# Patient Record
Sex: MALE
[De-identification: names, ages, dates, MRNs, and addresses within clinical notes are randomized per-mention and may not be internally consistent; named-entity substitution may affect disease eponyms.]

---

## 2023-06-05 PROBLEM — Z00.00 ENCOUNTER FOR PREVENTIVE HEALTH EXAMINATION: Status: ACTIVE | Noted: 2023-06-05

## 2023-06-07 ENCOUNTER — NON-APPOINTMENT (OUTPATIENT)
Age: 48
End: 2023-06-07

## 2023-06-14 ENCOUNTER — APPOINTMENT (OUTPATIENT)
Dept: FAMILY MEDICINE | Facility: CLINIC | Age: 48
End: 2023-06-14
Payer: COMMERCIAL

## 2023-06-14 ENCOUNTER — NON-APPOINTMENT (OUTPATIENT)
Age: 48
End: 2023-06-14

## 2023-06-14 VITALS
WEIGHT: 136 LBS | RESPIRATION RATE: 12 BRPM | HEART RATE: 65 BPM | DIASTOLIC BLOOD PRESSURE: 70 MMHG | SYSTOLIC BLOOD PRESSURE: 138 MMHG | HEIGHT: 67 IN | OXYGEN SATURATION: 99 % | BODY MASS INDEX: 21.35 KG/M2

## 2023-06-14 VITALS — SYSTOLIC BLOOD PRESSURE: 161 MMHG | DIASTOLIC BLOOD PRESSURE: 75 MMHG

## 2023-06-14 DIAGNOSIS — Z82.49 FAMILY HISTORY OF ISCHEMIC HEART DISEASE AND OTHER DISEASES OF THE CIRCULATORY SYSTEM: ICD-10-CM

## 2023-06-14 PROCEDURE — 99203 OFFICE O/P NEW LOW 30 MIN: CPT | Mod: 25

## 2023-06-14 PROCEDURE — 93000 ELECTROCARDIOGRAM COMPLETE: CPT

## 2023-06-14 NOTE — HISTORY OF PRESENT ILLNESS
[FreeTextEntry1] : pt presents to establish care as new patient \par pt presents for blood pressure check \par  [de-identified] : 47 yr old male new patient prior PCP years ago Dr. Alba. No prior medications and is former cigarette smoker who currently vapes.[facility]  had [facility] at [facility]. He was then later referred to general surgeon and is planned for surgery July 17th however he was noted to have  HIgh /99, 146/92. Today  he denies changes in vision, dizziness, chest pain, palpitations and lower extremity edema.\par \par \par

## 2023-06-14 NOTE — PHYSICAL EXAM
[No Acute Distress] : no acute distress [Normal Sclera/Conjunctiva] : normal sclera/conjunctiva [Normal Outer Ear/Nose] : the outer ears and nose were normal in appearance [No JVD] : no jugular venous distention [No Respiratory Distress] : no respiratory distress  [No Accessory Muscle Use] : no accessory muscle use [Normal Rate] : normal rate  [Regular Rhythm] : with a regular rhythm [Normal S1, S2] : normal S1 and S2 [No Murmur] : no murmur heard [No Carotid Bruits] : no carotid bruits [No Extremity Clubbing/Cyanosis] : no extremity clubbing/cyanosis [de-identified] : diminished breathe sounds

## 2023-06-14 NOTE — COUNSELING
[Yes] : Risk of tobacco use and health benefits of smoking cessation discussed: Yes [Cessation strategies including cessation program discussed] : Cessation strategies including cessation program discussed [Use of nicotine replacement therapies and other medications discussed] : Use of nicotine replacement therapies and other medications discussed [Encouraged to pick a quit date and identify support needed to quit] : Encouraged to pick a quit date and identify support needed to quit [Encouraged to increase physical activity] : Encouraged to increase physical activity

## 2023-06-14 NOTE — PLAN
[FreeTextEntry1] : 47 yr old male new patient \par \par elevated BP \par newly DX HTN: \par ECG NSR \par will refer to cardio for echo \par smoking cessation advised \par will start Norvasc 5 mg PO at bedtime \par will f/u in 2 weeks with BP \par monitor BP at home \par \par for PST due to RUQ \par will request ZP US \par will need labs at time \par \par RTO for follow up with above

## 2023-06-14 NOTE — HEALTH RISK ASSESSMENT
[Intercurrent Urgi Care visits] : went to urgent care [1 or 2 (0 pts)] : 1 or 2 (0 points) [Never (0 pts)] : Never (0 points) [No] : In the past 12 months have you used drugs other than those required for medical reasons? No [No falls in past year] : Patient reported no falls in the past year [Former] : Former [Employed] : employed [] :  [de-identified] : June 2nd  [de-identified] : gen surg  [Audit-CScore] : 0 [ColonoscopyComments] : EGD  [de-identified] : augusto

## 2023-06-21 ENCOUNTER — APPOINTMENT (OUTPATIENT)
Dept: FAMILY MEDICINE | Facility: CLINIC | Age: 48
End: 2023-06-21
Payer: COMMERCIAL

## 2023-06-21 VITALS — SYSTOLIC BLOOD PRESSURE: 136 MMHG | DIASTOLIC BLOOD PRESSURE: 72 MMHG

## 2023-06-21 VITALS
HEIGHT: 67 IN | SYSTOLIC BLOOD PRESSURE: 142 MMHG | TEMPERATURE: 97.6 F | DIASTOLIC BLOOD PRESSURE: 94 MMHG | OXYGEN SATURATION: 97 % | WEIGHT: 136 LBS | HEART RATE: 77 BPM | BODY MASS INDEX: 21.35 KG/M2

## 2023-06-21 VITALS — DIASTOLIC BLOOD PRESSURE: 92 MMHG | SYSTOLIC BLOOD PRESSURE: 138 MMHG

## 2023-06-21 PROCEDURE — 99212 OFFICE O/P EST SF 10 MIN: CPT

## 2023-06-21 NOTE — HISTORY OF PRESENT ILLNESS
[FreeTextEntry1] : Patient states he has been monitoring his blood pressure at home it rages around 168/78.   [de-identified] : 47 yr old male is here for follow up with BP. States he bought a wrist BP cuff and last reading on Saturday was 160/80. He is tolerating medication well without side effects. Denies changes in vision, dizziness, chest pain, palpitations and lower extremity edema.\par

## 2023-06-21 NOTE — COUNSELING
[Cessation strategies including cessation program discussed] : Cessation strategies including cessation program discussed [Use of nicotine replacement therapies and other medications discussed] : Use of nicotine replacement therapies and other medications discussed [Encouraged to pick a quit date and identify support needed to quit] : Encouraged to pick a quit date and identify support needed to quit [Encouraged to increase physical activity] : Encouraged to increase physical activity

## 2023-06-21 NOTE — PLAN
[FreeTextEntry1] : 47 yr old male BP check \par BP in office within goal range \par BP goal <140/80 \par healthy diet and physical activity as tolerated\par continue Norvasc \par low salt diet \par \par pt will RTO for PST \par \par

## 2023-07-11 ENCOUNTER — APPOINTMENT (OUTPATIENT)
Dept: FAMILY MEDICINE | Facility: CLINIC | Age: 48
End: 2023-07-11
Payer: COMMERCIAL

## 2023-07-11 VITALS
RESPIRATION RATE: 12 BRPM | HEIGHT: 67 IN | OXYGEN SATURATION: 98 % | TEMPERATURE: 98.1 F | DIASTOLIC BLOOD PRESSURE: 90 MMHG | HEART RATE: 66 BPM | SYSTOLIC BLOOD PRESSURE: 134 MMHG | BODY MASS INDEX: 21.35 KG/M2 | WEIGHT: 136 LBS

## 2023-07-11 DIAGNOSIS — Z76.89 PERSONS ENCOUNTERING HEALTH SERVICES IN OTHER SPECIFIED CIRCUMSTANCES: ICD-10-CM

## 2023-07-11 PROCEDURE — 99214 OFFICE O/P EST MOD 30 MIN: CPT

## 2023-07-20 NOTE — CONSULT LETTER
[Dear  ___] : Dear  [unfilled], [Consult Letter:] : I had the pleasure of evaluating your patient, [unfilled]. [Consult Closing:] : Thank you very much for allowing me to participate in the care of this patient.  If you have any questions, please do not hesitate to contact me. [FreeTextEntry1] : Patient is medically cleared

## 2023-07-20 NOTE — ASSESSMENT
[High Risk Surgery - Intraperitoneal, Intrathoracic or Supringuinal Vascular Procedures] : High Risk Surgery - Intraperitoneal, Intrathoracic or Supringuinal Vascular Procedures - No (0) [Ischemic Heart Disease] : Ischemic Heart Disease - No (0) [Congestive Heart Failure] : Congestive Heart Failure - No (0) [Prior Cerebrovascular Accident or TIA] : Prior Cerebrovascular Accident or TIA - No (0) [Creatinine >= 2mg/dL (1 Point)] : Creatinine >= 2mg/dL - No (0) [Insulin-dependent Diabetic (1 Point)] : Insulin-dependent Diabetic - No (0) [0] : 0 , RCRI Class: I, Risk of Post-Op Cardiac Complications: 3.9%, 95% CI for Risk Estimate: 2.8% - 5.4% [Patient Optimized for Surgery] : Patient optimized for surgery [No Further Testing Recommended] : no further testing recommended [Continue medications as is] : Continue current medications [As per surgery] : as per surgery [FreeTextEntry4] : patient is medically cleared for surgery

## 2023-07-20 NOTE — REVIEW OF SYSTEMS
[Diarrhea] : diarrhea [Negative] : Psychiatric [Abdominal Pain] : no abdominal pain [Nausea] : no nausea [Vomiting] : no vomiting [Heartburn] : no heartburn [Melena] : no melena [FreeTextEntry7] : discomfort right upper quadrant

## 2023-07-20 NOTE — PHYSICAL EXAM
[Normal] : normal rate, regular rhythm, normal S1 and S2 and no murmur heard [de-identified] : cracked right lower molar [de-identified] : left breast nipple ring [de-identified] : right upper quadrant guarding [de-identified] : tattoos on right arm left arm and left leg

## 2023-07-20 NOTE — HISTORY OF PRESENT ILLNESS
[No Pertinent Cardiac History] : no history of aortic stenosis, atrial fibrillation, coronary artery disease, recent myocardial infarction, or implantable device/pacemaker [Asthma] : asthma [Smoker] : smoker [Self] : previous adverse anesthesia reaction [(Patient denies any chest pain, claudication, dyspnea on exertion, orthopnea, palpitations or syncope)] : Patient denies any chest pain, claudication, dyspnea on exertion, orthopnea, palpitations or syncope [COPD] : no COPD [Sleep Apnea] : no sleep apnea [Family Member] : no family member with adverse anesthesia reaction/sudden death [Chronic Anticoagulation] : no chronic anticoagulation [Chronic Kidney Disease] : no chronic kidney disease [Diabetes] : no diabetes [FreeTextEntry1] : Cholecystectomy  [FreeTextEntry2] : 07/19/2023 [FreeTextEntry3] : Dr. Alan Quiroz  [FreeTextEntry4] : 48 yo wm here for pre op clearance  [FreeTextEntry5] : " it takes me a while to wake up from anesthesia. "

## 2023-07-27 ENCOUNTER — APPOINTMENT (OUTPATIENT)
Dept: FAMILY MEDICINE | Facility: CLINIC | Age: 48
End: 2023-07-27
Payer: COMMERCIAL

## 2023-07-27 VITALS
HEART RATE: 69 BPM | HEIGHT: 67 IN | TEMPERATURE: 98.1 F | OXYGEN SATURATION: 99 % | WEIGHT: 134 LBS | BODY MASS INDEX: 21.03 KG/M2 | RESPIRATION RATE: 12 BRPM | SYSTOLIC BLOOD PRESSURE: 110 MMHG | DIASTOLIC BLOOD PRESSURE: 80 MMHG

## 2023-07-27 PROCEDURE — 99214 OFFICE O/P EST MOD 30 MIN: CPT

## 2023-07-27 NOTE — PHYSICAL EXAM
[Normal] : normal rate, regular rhythm, normal S1 and S2 and no murmur heard [de-identified] : right upper quad lateral suture area sore soft no bruising  + bs x 4 q , umiblicus swollen  red  but no odor no discharge

## 2023-07-27 NOTE — HISTORY OF PRESENT ILLNESS
[Post-hospitalization from ___ Hospital] : Post-hospitalization from [unfilled] Hospital [Admitted on: ___] : The patient was admitted on [unfilled] [Discharged on ___] : discharged on [unfilled] [FreeTextEntry2] : 46 yo wm here for follow up lap cholecystectomy.  he vomited the first day.  he got a pain medicine and was discharged.  He has to sleep on his back and he usually sleeps on his side. he had johnson crackers. for 2 d. he hamburgers ,  he had a sub from [email or web address] and it was italian w cheese .  He still has pain 5/10 on the right side. \par

## 2023-07-27 NOTE — ASSESSMENT
[FreeTextEntry1] : sp lap cholecystectomy 7/19/23  He is doing well but has pain 5/10  . He is moving his bowels and eating a regular diet. His wounds are not infected . He is cleaning it with q tip soap and water as instructed by surgeon. Trial oxycodone  10 mg q 6 hr 28 prn pain\par BP stable. Monitor BP . cont amlodipine 10 mg  dc tobacco. discussed complications with wound healing and htn due to smoking. \par follow up with surgeon\par he will send me forms for disability  \par no lifting ,squatting straining. \par We spent sufficient time to discuss aspects of care; questions were answered  to patient's satisfaction.The diagnosis and care plan were discussed with patient in detail.  Patient test results were  reviewed and explained in full. All questions and concerns  were answered to the best of my knowledge.\par

## 2023-08-01 ENCOUNTER — APPOINTMENT (OUTPATIENT)
Dept: FAMILY MEDICINE | Facility: CLINIC | Age: 48
End: 2023-08-01

## 2023-08-01 RX ORDER — OXYCODONE AND ACETAMINOPHEN 10; 325 MG/1; MG/1
10-325 TABLET ORAL EVERY 6 HOURS
Qty: 28 | Refills: 0 | Status: ACTIVE | COMMUNITY
Start: 2023-07-27

## 2023-09-08 ENCOUNTER — RX RENEWAL (OUTPATIENT)
Age: 48
End: 2023-09-08

## 2023-10-17 ENCOUNTER — APPOINTMENT (OUTPATIENT)
Dept: FAMILY MEDICINE | Facility: CLINIC | Age: 48
End: 2023-10-17

## 2023-12-07 ENCOUNTER — RX RENEWAL (OUTPATIENT)
Age: 48
End: 2023-12-07

## 2024-01-26 ENCOUNTER — APPOINTMENT (OUTPATIENT)
Dept: FAMILY MEDICINE | Facility: CLINIC | Age: 49
End: 2024-01-26
Payer: COMMERCIAL

## 2024-01-26 VITALS — DIASTOLIC BLOOD PRESSURE: 80 MMHG | SYSTOLIC BLOOD PRESSURE: 121 MMHG

## 2024-01-26 VITALS
OXYGEN SATURATION: 99 % | HEIGHT: 67 IN | TEMPERATURE: 98.1 F | RESPIRATION RATE: 13 BRPM | WEIGHT: 138 LBS | HEART RATE: 70 BPM | BODY MASS INDEX: 21.66 KG/M2 | DIASTOLIC BLOOD PRESSURE: 74 MMHG | SYSTOLIC BLOOD PRESSURE: 120 MMHG

## 2024-01-26 DIAGNOSIS — F17.200 NICOTINE DEPENDENCE, UNSPECIFIED, UNCOMPLICATED: ICD-10-CM

## 2024-01-26 DIAGNOSIS — I10 ESSENTIAL (PRIMARY) HYPERTENSION: ICD-10-CM

## 2024-01-26 DIAGNOSIS — R10.11 RIGHT UPPER QUADRANT PAIN: ICD-10-CM

## 2024-01-26 DIAGNOSIS — Z01.818 ENCOUNTER FOR OTHER PREPROCEDURAL EXAMINATION: ICD-10-CM

## 2024-01-26 PROCEDURE — 99214 OFFICE O/P EST MOD 30 MIN: CPT

## 2024-01-26 RX ORDER — AMLODIPINE BESYLATE 5 MG/1
5 TABLET ORAL
Qty: 90 | Refills: 1 | Status: ACTIVE | COMMUNITY
Start: 2023-06-14 | End: 1900-01-01

## 2024-01-26 NOTE — PHYSICAL EXAM
[No Acute Distress] : no acute distress [Normal Sclera/Conjunctiva] : normal sclera/conjunctiva [Normal Outer Ear/Nose] : the outer ears and nose were normal in appearance [No Respiratory Distress] : no respiratory distress  [No Accessory Muscle Use] : no accessory muscle use [Clear to Auscultation] : lungs were clear to auscultation bilaterally [Normal Rate] : normal rate  [Regular Rhythm] : with a regular rhythm [Normal S1, S2] : normal S1 and S2 [No Murmur] : no murmur heard [Normal Gait] : normal gait [No Extremity Clubbing/Cyanosis] : no extremity clubbing/cyanosis [Alert and Oriented x3] : oriented to person, place, and time

## 2024-01-26 NOTE — PLAN
[FreeTextEntry1] : BP in office within goal range  BP goal <140/80  healthy diet and physical activity as tolerated continue Norvasc  reviewed prior labs  vape cessation discussed RTO for CPE

## 2024-01-26 NOTE — HISTORY OF PRESENT ILLNESS
[FreeTextEntry1] : Patient presents with follow up for HTN.  [de-identified] : 48M is here for BP check. He acute medical concerns. Denies changes in vision, dizziness, chest pain, palpitations.

## 2024-08-27 ENCOUNTER — RX RENEWAL (OUTPATIENT)
Age: 49
End: 2024-08-27

## 2025-02-24 ENCOUNTER — RX RENEWAL (OUTPATIENT)
Age: 50
End: 2025-02-24

## 2025-03-03 ENCOUNTER — APPOINTMENT (OUTPATIENT)
Dept: FAMILY MEDICINE | Facility: CLINIC | Age: 50
End: 2025-03-03
Payer: COMMERCIAL

## 2025-03-03 ENCOUNTER — TRANSCRIPTION ENCOUNTER (OUTPATIENT)
Age: 50
End: 2025-03-03

## 2025-03-03 DIAGNOSIS — R68.89 OTHER GENERAL SYMPTOMS AND SIGNS: ICD-10-CM

## 2025-03-03 PROCEDURE — 99213 OFFICE O/P EST LOW 20 MIN: CPT | Mod: 95

## 2025-08-23 ENCOUNTER — RX RENEWAL (OUTPATIENT)
Age: 50
End: 2025-08-23